# Patient Record
Sex: FEMALE | Race: WHITE | HISPANIC OR LATINO | Employment: FULL TIME | URBAN - METROPOLITAN AREA
[De-identification: names, ages, dates, MRNs, and addresses within clinical notes are randomized per-mention and may not be internally consistent; named-entity substitution may affect disease eponyms.]

---

## 2020-05-14 ENCOUNTER — OFFICE VISIT (OUTPATIENT)
Dept: URGENT CARE | Facility: CLINIC | Age: 45
End: 2020-05-14
Payer: COMMERCIAL

## 2020-05-14 VITALS
TEMPERATURE: 98.2 F | SYSTOLIC BLOOD PRESSURE: 124 MMHG | OXYGEN SATURATION: 100 % | DIASTOLIC BLOOD PRESSURE: 82 MMHG | HEART RATE: 95 BPM | HEIGHT: 64 IN | WEIGHT: 240 LBS | BODY MASS INDEX: 40.97 KG/M2 | RESPIRATION RATE: 18 BRPM

## 2020-05-14 DIAGNOSIS — H92.09 OTALGIA, UNSPECIFIED LATERALITY: ICD-10-CM

## 2020-05-14 DIAGNOSIS — J30.2 SEASONAL ALLERGIES: ICD-10-CM

## 2020-05-14 DIAGNOSIS — R21 RASH AND NONSPECIFIC SKIN ERUPTION: ICD-10-CM

## 2020-05-14 DIAGNOSIS — R59.1 LYMPHADENOPATHY: ICD-10-CM

## 2020-05-14 PROCEDURE — 99204 OFFICE O/P NEW MOD 45 MIN: CPT | Performed by: PHYSICIAN ASSISTANT

## 2020-05-14 RX ORDER — LOSARTAN POTASSIUM 50 MG/1
50 TABLET ORAL DAILY
COMMUNITY
Start: 2020-03-12 | End: 2020-10-16 | Stop reason: SDUPTHER

## 2020-05-14 RX ORDER — ACYCLOVIR 400 MG/1
800 TABLET ORAL 2 TIMES DAILY
Qty: 40 TAB | Refills: 0 | Status: SHIPPED | OUTPATIENT
Start: 2020-05-14 | End: 2020-05-24

## 2020-05-14 RX ORDER — METHYLPREDNISOLONE 4 MG/1
TABLET ORAL
Qty: 21 TAB | Refills: 0 | Status: SHIPPED | OUTPATIENT
Start: 2020-05-14 | End: 2020-05-14 | Stop reason: CLARIF

## 2020-05-14 RX ORDER — MONTELUKAST SODIUM 10 MG/1
TABLET ORAL
COMMUNITY
End: 2020-10-16 | Stop reason: SDUPTHER

## 2020-05-14 ASSESSMENT — ENCOUNTER SYMPTOMS
FOCAL WEAKNESS: 0
WHEEZING: 0
ABDOMINAL PAIN: 0
SPUTUM PRODUCTION: 0
SHORTNESS OF BREATH: 0
VOMITING: 0
DIARRHEA: 0
SENSORY CHANGE: 0
FEVER: 0
CHILLS: 1
COUGH: 0
SORE THROAT: 0
NAUSEA: 0
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
## 2020-10-13 ENCOUNTER — TELEPHONE (OUTPATIENT)
Dept: URGENT CARE | Facility: CLINIC | Age: 45
End: 2020-10-13

## 2020-10-14 ENCOUNTER — TELEPHONE (OUTPATIENT)
Dept: SCHEDULING | Facility: IMAGING CENTER | Age: 45
End: 2020-10-14

## 2020-10-14 DIAGNOSIS — J45.909 UNCOMPLICATED ASTHMA, UNSPECIFIED ASTHMA SEVERITY, UNSPECIFIED WHETHER PERSISTENT: ICD-10-CM

## 2020-10-16 ENCOUNTER — OFFICE VISIT (OUTPATIENT)
Dept: MEDICAL GROUP | Facility: MEDICAL CENTER | Age: 45
End: 2020-10-16
Payer: COMMERCIAL

## 2020-10-16 VITALS
DIASTOLIC BLOOD PRESSURE: 82 MMHG | OXYGEN SATURATION: 96 % | TEMPERATURE: 98.3 F | SYSTOLIC BLOOD PRESSURE: 134 MMHG | HEART RATE: 88 BPM | WEIGHT: 242.51 LBS | HEIGHT: 64 IN | BODY MASS INDEX: 41.4 KG/M2 | RESPIRATION RATE: 20 BRPM

## 2020-10-16 DIAGNOSIS — Z87.74 HISTORY OF REPAIR OF CONGENITAL ANOMALY OF HEART: ICD-10-CM

## 2020-10-16 DIAGNOSIS — J45.20 MILD INTERMITTENT ASTHMA WITHOUT COMPLICATION: ICD-10-CM

## 2020-10-16 DIAGNOSIS — Z12.31 ENCOUNTER FOR SCREENING MAMMOGRAM FOR MALIGNANT NEOPLASM OF BREAST: ICD-10-CM

## 2020-10-16 DIAGNOSIS — F41.9 ANXIETY: ICD-10-CM

## 2020-10-16 DIAGNOSIS — Z23 NEED FOR DIPHTHERIA-TETANUS-PERTUSSIS (TDAP) VACCINE: ICD-10-CM

## 2020-10-16 DIAGNOSIS — Z90.710 HX OF HYSTERECTOMY: ICD-10-CM

## 2020-10-16 DIAGNOSIS — E66.01 MORBID (SEVERE) OBESITY DUE TO EXCESS CALORIES (HCC): ICD-10-CM

## 2020-10-16 DIAGNOSIS — I10 ESSENTIAL HYPERTENSION: ICD-10-CM

## 2020-10-16 DIAGNOSIS — Z23 NEED FOR IMMUNIZATION AGAINST INFLUENZA: ICD-10-CM

## 2020-10-16 DIAGNOSIS — J30.1 ALLERGIC RHINITIS DUE TO POLLEN, UNSPECIFIED SEASONALITY: ICD-10-CM

## 2020-10-16 PROBLEM — E66.09 CLASS 2 OBESITY DUE TO EXCESS CALORIES WITH BODY MASS INDEX (BMI) OF 38.0 TO 38.9 IN ADULT: Status: ACTIVE | Noted: 2018-03-08

## 2020-10-16 PROBLEM — E55.9 VITAMIN D DEFICIENCY: Status: ACTIVE | Noted: 2019-06-21

## 2020-10-16 PROBLEM — M25.551 PAIN OF RIGHT HIP JOINT: Status: ACTIVE | Noted: 2018-05-13

## 2020-10-16 PROBLEM — M53.3 SACROILIAC JOINT PAIN: Status: ACTIVE | Noted: 2018-05-02

## 2020-10-16 PROBLEM — E66.09 CLASS 2 OBESITY DUE TO EXCESS CALORIES WITH BODY MASS INDEX (BMI) OF 38.0 TO 38.9 IN ADULT: Status: RESOLVED | Noted: 2018-03-08 | Resolved: 2020-10-16

## 2020-10-16 PROCEDURE — 99214 OFFICE O/P EST MOD 30 MIN: CPT | Mod: 25 | Performed by: PHYSICIAN ASSISTANT

## 2020-10-16 PROCEDURE — 90472 IMMUNIZATION ADMIN EACH ADD: CPT | Performed by: PHYSICIAN ASSISTANT

## 2020-10-16 PROCEDURE — 90471 IMMUNIZATION ADMIN: CPT | Performed by: PHYSICIAN ASSISTANT

## 2020-10-16 PROCEDURE — 90686 IIV4 VACC NO PRSV 0.5 ML IM: CPT | Performed by: PHYSICIAN ASSISTANT

## 2020-10-16 PROCEDURE — 90715 TDAP VACCINE 7 YRS/> IM: CPT | Performed by: PHYSICIAN ASSISTANT

## 2020-10-16 RX ORDER — LOSARTAN POTASSIUM 50 MG/1
75 TABLET ORAL DAILY
Qty: 145 TAB | Refills: 3 | Status: SHIPPED | OUTPATIENT
Start: 2020-10-16 | End: 2021-01-14

## 2020-10-16 RX ORDER — MONTELUKAST SODIUM 10 MG/1
10 TABLET ORAL DAILY
Qty: 90 TAB | Refills: 3 | Status: SHIPPED | OUTPATIENT
Start: 2020-10-16 | End: 2021-09-20

## 2020-10-16 RX ORDER — ALBUTEROL SULFATE 90 UG/1
2 AEROSOL, METERED RESPIRATORY (INHALATION) EVERY 4 HOURS PRN
Qty: 18 EACH | Refills: 3 | Status: SHIPPED | OUTPATIENT
Start: 2020-10-16

## 2020-10-16 RX ORDER — CICLESONIDE 80 UG/1
AEROSOL, METERED RESPIRATORY (INHALATION)
COMMUNITY
Start: 2019-07-22 | End: 2020-10-16

## 2020-10-16 RX ORDER — ALBUTEROL SULFATE 90 UG/1
AEROSOL, METERED RESPIRATORY (INHALATION)
COMMUNITY

## 2020-10-16 SDOH — HEALTH STABILITY: MENTAL HEALTH: HOW MANY STANDARD DRINKS CONTAINING ALCOHOL DO YOU HAVE ON A TYPICAL DAY?: 1 OR 2

## 2020-10-16 SDOH — HEALTH STABILITY: MENTAL HEALTH: HOW OFTEN DO YOU HAVE A DRINK CONTAINING ALCOHOL?: MONTHLY OR LESS

## 2020-10-16 ASSESSMENT — PATIENT HEALTH QUESTIONNAIRE - PHQ9: CLINICAL INTERPRETATION OF PHQ2 SCORE: 0

## 2020-10-16 NOTE — ASSESSMENT & PLAN NOTE
Chronic, stable on current, feels she did better on dulera than current daily inhaler, would like to switch if covered on insurance. Did have really bad pneumonia last year. Has had to be treated with oral steroids in the past.
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
## 2020-10-17 NOTE — ASSESSMENT & PLAN NOTE
Typically sees cardiology once a year and has echo twice a year. Needs to establish with cardiologist here in [place]. No chest pain, SOB, difficulty breathing.

## 2020-10-20 ENCOUNTER — PATIENT MESSAGE (OUTPATIENT)
Dept: MEDICAL GROUP | Facility: MEDICAL CENTER | Age: 45
End: 2020-10-20

## 2020-12-07 ENCOUNTER — HOSPITAL ENCOUNTER (OUTPATIENT)
Dept: LAB | Facility: MEDICAL CENTER | Age: 45
End: 2020-12-07
Attending: PHYSICIAN ASSISTANT
Payer: COMMERCIAL

## 2020-12-07 ENCOUNTER — NURSE TRIAGE (OUTPATIENT)
Dept: HEALTH INFORMATION MANAGEMENT | Facility: OTHER | Age: 45
End: 2020-12-07

## 2020-12-07 ENCOUNTER — TELEMEDICINE (OUTPATIENT)
Dept: MEDICAL GROUP | Facility: MEDICAL CENTER | Age: 45
End: 2020-12-07
Payer: COMMERCIAL

## 2020-12-07 VITALS — WEIGHT: 244 LBS | TEMPERATURE: 98 F | HEIGHT: 64 IN | BODY MASS INDEX: 41.66 KG/M2

## 2020-12-07 DIAGNOSIS — Z20.822 ENCOUNTER BY TELEHEALTH FOR SUSPECTED COVID-19: ICD-10-CM

## 2020-12-07 LAB — COVID ORDER STATUS COVID19: NORMAL

## 2020-12-07 PROCEDURE — U0003 INFECTIOUS AGENT DETECTION BY NUCLEIC ACID (DNA OR RNA); SEVERE ACUTE RESPIRATORY SYNDROME CORONAVIRUS 2 (SARS-COV-2) (CORONAVIRUS DISEASE [COVID-19]), AMPLIFIED PROBE TECHNIQUE, MAKING USE OF HIGH THROUGHPUT TECHNOLOGIES AS DESCRIBED BY CMS-2020-01-R: HCPCS

## 2020-12-07 PROCEDURE — 99214 OFFICE O/P EST MOD 30 MIN: CPT | Mod: 95,CR,CS | Performed by: PHYSICIAN ASSISTANT

## 2020-12-07 PROCEDURE — C9803 HOPD COVID-19 SPEC COLLECT: HCPCS

## 2020-12-07 NOTE — TELEPHONE ENCOUNTER
"    Reason for Disposition  • [1] Fever returns after gone for over 24 hours AND [2] symptoms worse or not improved    Additional Information  • Negative: SEVERE difficulty breathing (e.g., struggling for each breath, speaks in single words)  • Negative: Difficult to awaken or acting confused (e.g., disoriented, slurred speech)  • Negative: Bluish (or gray) lips or face now  • Negative: Shock suspected (e.g., cold/pale/clammy skin, too weak to stand, low BP, rapid pulse)  • Negative: Sounds like a life-threatening emergency to the triager  • Negative: [1] COVID-19 suspected (e.g., cough, fever, shortness of breath) AND [2] public health department recommends testing  • Negative: [1] COVID-19 exposure AND [2] no symptoms  • Negative: COVID-19 and Breastfeeding, questions about  • Negative: SEVERE or constant chest pain (Exception: mild central chest pain, present only when coughing)  • Negative: MODERATE difficulty breathing (e.g., speaks in phrases, SOB even at rest, pulse 100-120)  • Negative: MILD difficulty breathing (e.g., minimal/no SOB at rest, SOB with walking, pulse <100)  • Negative: Chest pain  • Negative: Patient sounds very sick or weak to the triager  • Negative: Fever > 103 F (39.4 C)  • Negative: [1] Fever > 101 F (38.3 C) AND [2] age > 60  • Negative: [1] Fever > 100.0 F (37.8 C) AND [2] bedridden (e.g., nursing home patient, CVA, chronic illness, recovering from surgery)  • Negative: HIGH RISK patient (e.g., age > 64 years, diabetes, heart or lung disease, weak immune system)    Answer Assessment - Initial Assessment Questions  1. COVID-19 DIAGNOSIS: \"Who made your Coronavirus (COVID-19) diagnosis?\" \"Was it confirmed by a positive lab test?\" If not diagnosed by a HCP, ask \"Are there lots of cases (community spread) where you live?\" (See public health department website, if unsure)    * MAJOR community spread: high number of cases; numbers of cases are increasing; many people hospitalized.    * MINOR " "community spread: low number of cases; not increasing; few or no people hospitalized2      major  2. ONSET: \"When did the COVID-19 symptoms start?\"       12/3/20  3. WORST SYMPTOM: \"What is your worst symptom?\" (e.g., cough, fever, shortness of breath, muscle aches)      Nausea, diarrhea  4. COUGH: \"How bad is the cough?\"        Just normal asthma cough  5. FEVER: \"Do you have a fever?\" If so, ask: \"What is your temperature, how was it measured, and when did it start?\"      No  6. RESPIRATORY STATUS: \"Describe your breathing?\" (e.g., shortness of breath, wheezing, unable to speak)       wheezing  7. BETTER-SAME-WORSE: \"Are you getting better, staying the same or getting worse compared to yesterday?\"  If getting worse, ask, \"In what way?\"      same  8. HIGH RISK DISEASE: \"Do you have any chronic medical problems?\" (e.g., asthma, heart or lung disease, weak immune system, etc.)      Asthma, heart surg was infant, some arrhythmia  9. PREGNANCY: \"Is there any chance you are pregnant?\" \"When was your last menstrual period?\"      No, hysterectomy  10. OTHER SYMPTOMS: \"Do you have any other symptoms?\"  (e.g., runny nose, headache, sore throat, loss of smell)         diarrhea, chills, nasal congestion, nausea, headache    Protocols used: CORONAVIRUS (COVID-19) DIAGNOSED OR [id number]      "

## 2020-12-07 NOTE — ASSESSMENT & PLAN NOTE
Started with symptoms on Thursday. Initially sinus/nasal congestion, headache. Yesterday diarrhea and chills. Works at [employer]. Staying home in quarantine, needs to get tested for coronavirus. Using inhaler. Having mild asthma symptom of wheezing but no SOB, chest pain/pressure, difficulty breathing. No fever. Understands ER precautions.

## 2020-12-07 NOTE — PROGRESS NOTES
Virtual Visit: Established Patient   This visit was conducted via Zoom using secure and encrypted videoconferencing technology. The patient was in a private location in the state of [place].    The patient's identity was confirmed and verbal consent was obtained for this virtual visit.    Subjective:   CC:   Chief Complaint   Patient presents with   • Coronavirus Screening     sinus, congestion, headache, wheezing, chills, diarrhea x 5 days       Karmen Sims is a 45 y.o. female presenting for evaluation and management of:    Encounter by telehealth for suspected COVID-19  Started with symptoms on Thursday. Initially sinus/nasal congestion, headache. Yesterday diarrhea and chills. Works at [employer]. Staying home in quarantine, needs to get tested for coronavirus. Using inhaler. Having mild asthma symptom of wheezing but no SOB, chest pain/pressure, difficulty breathing. No fever. Understands ER precautions.      ROS   Denies any recent fevers. No nausea or vomiting. No chest pains or shortness of breath.     Allergies   Allergen Reactions   • Sulfa Drugs    • Fluticasone-Salmeterol Unspecified     wheezing  Exacerbates asthma symptoms      • Vicodin [Hydrocodone-Acetaminophen]        Current medicines (including changes today)  Current Outpatient Medications   Medication Sig Dispense Refill   • Mometasone Furoate 200 MCG/ACT Aerosol Inhale 2 Inhalation by mouth 2 Times a Day. 39 g 1   • Multiple Vitamin (MULTI VITAMIN DAILY PO) Take  by mouth.     • Probiotic Product (PROBIOTIC PO) Take  by mouth.     • albuterol 108 (90 Base) MCG/ACT Aero Soln inhalation aerosol Inhale  by mouth.     • losartan (COZAAR) 50 MG Tab Take 1.5 Tabs by mouth every day for 90 days. Taking 75 mg daily. 1.5 tabs. 145 Tab 3   • montelukast (SINGULAIR) 10 MG Tab Take 1 Tab by mouth every day. 90 Tab 3   • VITAMIN D, CHOLECALCIFEROL, PO Take 400 Int'l Units by mouth.     • albuterol 108 (90 Base) MCG/ACT Aero Soln inhalation  "aerosol Inhale 2 Puffs by mouth every four hours as needed for Shortness of Breath. (Patient not taking: Reported on 12/7/2020) 18 Each 3     No current facility-administered medications for this visit.        Patient Active Problem List    Diagnosis Date Noted   • Encounter by telehealth for suspected COVID-19 12/07/2020   • Asthma, mild intermittent 10/16/2020   • Morbid (severe) obesity due to excess calories (HCC) 01/17/2020   • History of repair of congenital anomaly of heart 06/21/2019   • Hx of hysterectomy 06/21/2019   • Vitamin D deficiency 06/21/2019   • Pain of right hip joint 05/13/2018   • Sacroiliac joint pain 05/02/2018   • Essential hypertension 06/09/2014   • Allergic rhinitis 02/11/2014       Family History   Problem Relation Age of Onset   • Heart Disease Mother        She  has no past medical history on file.  She  has no past surgical history on file.       Objective:   Temp 36.7 °C (98 °F) (Temporal)   Ht 1.626 m (5' 4\")   Wt 110.7 kg (244 lb) Comment: Pt reported  Breastfeeding No   BMI 41.88 kg/m²     Physical Exam:  Constitutional: Alert, no distress, well-groomed.  Skin: No rashes in visible areas.  Eye: Round. Conjunctiva clear, lids normal. No icterus.   ENMT: Lips pink without lesions, good dentition, moist mucous membranes. Phonation normal.  Neck: No masses, no thyromegaly. Moves freely without pain.  Respiratory: Unlabored respiratory effort, no cough or audible wheeze  Psych: Alert and oriented x3, normal affect and mood.       Assessment and Plan:   The following treatment plan was discussed:     1. Encounter by telehealth for suspected COVID-19  - COVID/SARS COV-2 PCR; Future        Follow-up: with lab results         "

## 2020-12-08 LAB
SARS-COV-2 RNA RESP QL NAA+PROBE: NOTDETECTED
SPECIMEN SOURCE: NORMAL

## 2021-01-29 ENCOUNTER — PATIENT MESSAGE (OUTPATIENT)
Dept: MEDICAL GROUP | Facility: MEDICAL CENTER | Age: 46
End: 2021-01-29

## 2021-01-29 ENCOUNTER — TELEMEDICINE (OUTPATIENT)
Dept: MEDICAL GROUP | Facility: MEDICAL CENTER | Age: 46
End: 2021-01-29
Payer: COMMERCIAL

## 2021-01-29 VITALS — BODY MASS INDEX: 41.32 KG/M2 | WEIGHT: 242 LBS | HEIGHT: 64 IN

## 2021-01-29 DIAGNOSIS — J45.20 MILD INTERMITTENT ASTHMA WITHOUT COMPLICATION: ICD-10-CM

## 2021-01-29 DIAGNOSIS — J30.1 ALLERGIC RHINITIS DUE TO POLLEN, UNSPECIFIED SEASONALITY: ICD-10-CM

## 2021-01-29 PROCEDURE — 99213 OFFICE O/P EST LOW 20 MIN: CPT | Mod: 95,CR | Performed by: PHYSICIAN ASSISTANT

## 2021-01-29 RX ORDER — FEXOFENADINE HCL 60 MG/1
60 TABLET, FILM COATED ORAL DAILY
COMMUNITY

## 2021-01-29 RX ORDER — LOSARTAN POTASSIUM 50 MG/1
75 TABLET ORAL DAILY
COMMUNITY
End: 2021-09-20

## 2021-01-29 ASSESSMENT — PATIENT HEALTH QUESTIONNAIRE - PHQ9: CLINICAL INTERPRETATION OF PHQ2 SCORE: 0

## 2021-01-29 NOTE — ASSESSMENT & PLAN NOTE
Not doing well on asthmanex 2.5 months. Needing albuterol 3-5 times per week. Sometimes 2-3 times a day. Can't do advair d/t side effect/allergy. Has been on arnuity in the past.

## 2021-02-01 NOTE — PROGRESS NOTES
Virtual Visit: Established Patient   This visit was conducted via Zoom using secure and encrypted videoconferencing technology. The patient was in a private location in the state of [place].    The patient's identity was confirmed and verbal consent was obtained for this virtual visit.    Subjective:   CC:   Chief Complaint   Patient presents with   • Asthma     Discuss concerns       Karmen Sims is a 45 y.o. female presenting for evaluation and management of:    Asthma, mild intermittent  Not doing well on asthmanex 2.5 months. Needing albuterol 3-5 times per week. Sometimes 2-3 times a day. Can't do advair d/t side effect/allergy. Has been on arnuity in the past.       ROS   Denies any recent fevers or chills. No nausea or vomiting. No chest pains or shortness of breath.     Allergies   Allergen Reactions   • Sulfa Drugs    • Fluticasone-Salmeterol Unspecified     wheezing  Exacerbates asthma symptoms      • Vicodin [Hydrocodone-Acetaminophen]        Current medicines (including changes today)  Current Outpatient Medications   Medication Sig Dispense Refill   • losartan (COZAAR) 50 MG Tab Take 75 mg by mouth every day.     • fexofenadine (ALLEGRA) 60 MG Tab Take 60 mg by mouth every day.     • Mometasone Furoate 200 MCG/ACT Aerosol Inhale 2 Inhalation by mouth 2 Times a Day. 39 g 1   • Multiple Vitamin (MULTI VITAMIN DAILY PO) Take  by mouth.     • Probiotic Product (PROBIOTIC PO) Take  by mouth.     • albuterol 108 (90 Base) MCG/ACT Aero Soln inhalation aerosol Inhale  by mouth.     • montelukast (SINGULAIR) 10 MG Tab Take 1 Tab by mouth every day. 90 Tab 3   • VITAMIN D, CHOLECALCIFEROL, PO Take 400 Int'l Units by mouth.     • albuterol 108 (90 Base) MCG/ACT Aero Soln inhalation aerosol Inhale 2 Puffs by mouth every four hours as needed for Shortness of Breath. (Patient not taking: Reported on 12/7/2020) 18 Each 3     No current facility-administered medications for this visit.        Patient  "Active Problem List    Diagnosis Date Noted   • Encounter by telehealth for suspected COVID-19 12/07/2020   • Asthma, mild intermittent 10/16/2020   • Morbid (severe) obesity due to excess calories (HCC) 01/17/2020   • History of repair of congenital anomaly of heart 06/21/2019   • Hx of hysterectomy 06/21/2019   • Vitamin D deficiency 06/21/2019   • Pain of right hip joint 05/13/2018   • Sacroiliac joint pain 05/02/2018   • Essential hypertension 06/09/2014   • Allergic rhinitis 02/11/2014       Family History   Problem Relation Age of Onset   • Heart Disease Mother        She  has no past medical history on file.  She  has no past surgical history on file.       Objective:   Ht 1.626 m (5' 4\")   Wt 110 kg (242 lb) Comment: Pt reported  Breastfeeding No   BMI 41.54 kg/m²     Physical Exam:  Constitutional: Alert, no distress, well-groomed.  Skin: No rashes in visible areas.  Eye: Round. Conjunctiva clear, lids normal. No icterus.   ENMT: Lips pink without lesions, good dentition, moist mucous membranes. Phonation normal.  Neck: No masses, no thyromegaly. Moves freely without pain.  Respiratory: Unlabored respiratory effort, no cough or audible wheeze  Psych: Alert and oriented x3, normal affect and mood.       Assessment and Plan:   The following treatment plan was discussed:     1. Mild intermittent asthma without complication  - REFERRAL TO ALLERGY    2. Allergic rhinitis due to pollen, unspecified seasonality  - REFERRAL TO ALLERGY    Other orders  - losartan (COZAAR) 50 MG Tab; Take 75 mg by mouth every day.  - fexofenadine (ALLEGRA) 60 MG Tab; Take 60 mg by mouth every day.        Follow-up: No follow-ups on file.         "

## 2021-09-17 DIAGNOSIS — J30.1 ALLERGIC RHINITIS DUE TO POLLEN, UNSPECIFIED SEASONALITY: ICD-10-CM

## 2021-09-20 RX ORDER — LOSARTAN POTASSIUM 50 MG/1
TABLET ORAL
Qty: 145 TABLET | Refills: 1 | Status: SHIPPED | OUTPATIENT
Start: 2021-09-20

## 2021-09-20 RX ORDER — MONTELUKAST SODIUM 10 MG/1
TABLET ORAL
Qty: 90 TABLET | Refills: 1 | Status: SHIPPED | OUTPATIENT
Start: 2021-09-20

## 2023-06-29 ENCOUNTER — TELEPHONE (OUTPATIENT)
Dept: MEDICAL GROUP | Facility: MEDICAL CENTER | Age: 48
End: 2023-06-29